# Patient Record
Sex: FEMALE | Race: BLACK OR AFRICAN AMERICAN | NOT HISPANIC OR LATINO | Employment: FULL TIME | URBAN - METROPOLITAN AREA
[De-identification: names, ages, dates, MRNs, and addresses within clinical notes are randomized per-mention and may not be internally consistent; named-entity substitution may affect disease eponyms.]

---

## 2019-10-22 ENCOUNTER — TELEPHONE (OUTPATIENT)
Dept: ORTHOPEDICS | Facility: CLINIC | Age: 44
End: 2019-10-22

## 2019-10-22 DIAGNOSIS — M79.605 LEFT LEG PAIN: Primary | ICD-10-CM

## 2019-10-23 ENCOUNTER — OFFICE VISIT (OUTPATIENT)
Dept: SPORTS MEDICINE | Facility: CLINIC | Age: 44
End: 2019-10-23
Payer: COMMERCIAL

## 2019-10-23 ENCOUNTER — HOSPITAL ENCOUNTER (OUTPATIENT)
Dept: RADIOLOGY | Facility: HOSPITAL | Age: 44
Discharge: HOME | End: 2019-10-23
Attending: ORTHOPAEDIC SURGERY
Payer: COMMERCIAL

## 2019-10-23 ENCOUNTER — TELEPHONE (OUTPATIENT)
Dept: SPORTS MEDICINE | Facility: CLINIC | Age: 44
End: 2019-10-23

## 2019-10-23 VITALS
SYSTOLIC BLOOD PRESSURE: 121 MMHG | HEIGHT: 64 IN | HEART RATE: 66 BPM | WEIGHT: 226 LBS | BODY MASS INDEX: 38.58 KG/M2 | DIASTOLIC BLOOD PRESSURE: 84 MMHG

## 2019-10-23 DIAGNOSIS — M79.605 LEFT LEG PAIN: ICD-10-CM

## 2019-10-23 DIAGNOSIS — S86.112A GASTROCNEMIUS MUSCLE STRAIN, LEFT, INITIAL ENCOUNTER: ICD-10-CM

## 2019-10-23 DIAGNOSIS — M79.662 PAIN OF LEFT LOWER LEG: ICD-10-CM

## 2019-10-23 DIAGNOSIS — M79.662 PAIN OF LEFT LOWER LEG: Primary | ICD-10-CM

## 2019-10-23 PROCEDURE — 99999 PR PBB SHADOW E&M-EST. PATIENT-LVL III: ICD-10-PCS | Mod: PBBFAC,,, | Performed by: ORTHOPAEDIC SURGERY

## 2019-10-23 PROCEDURE — 73590 X-RAY EXAM OF LOWER LEG: CPT | Mod: TC,LT

## 2019-10-23 PROCEDURE — 93970 US LOWER EXTREMITY VEINS BILATERAL: ICD-10-PCS | Mod: 26,,, | Performed by: RADIOLOGY

## 2019-10-23 PROCEDURE — 73590 X-RAY EXAM OF LOWER LEG: CPT | Mod: 26,LT,, | Performed by: RADIOLOGY

## 2019-10-23 PROCEDURE — 93970 EXTREMITY STUDY: CPT | Mod: 26,,, | Performed by: RADIOLOGY

## 2019-10-23 PROCEDURE — 3008F PR BODY MASS INDEX (BMI) DOCUMENTED: ICD-10-PCS | Mod: CPTII,S$GLB,, | Performed by: ORTHOPAEDIC SURGERY

## 2019-10-23 PROCEDURE — 3008F BODY MASS INDEX DOCD: CPT | Mod: CPTII,S$GLB,, | Performed by: ORTHOPAEDIC SURGERY

## 2019-10-23 PROCEDURE — 99204 OFFICE O/P NEW MOD 45 MIN: CPT | Mod: S$GLB,,, | Performed by: ORTHOPAEDIC SURGERY

## 2019-10-23 PROCEDURE — 73590 XR TIBIA FIBULA 2 VIEW LEFT: ICD-10-PCS | Mod: 26,LT,, | Performed by: RADIOLOGY

## 2019-10-23 PROCEDURE — 93970 EXTREMITY STUDY: CPT | Mod: TC

## 2019-10-23 PROCEDURE — 99204 PR OFFICE/OUTPT VISIT, NEW, LEVL IV, 45-59 MIN: ICD-10-PCS | Mod: S$GLB,,, | Performed by: ORTHOPAEDIC SURGERY

## 2019-10-23 PROCEDURE — 99999 PR PBB SHADOW E&M-EST. PATIENT-LVL III: CPT | Mod: PBBFAC,,, | Performed by: ORTHOPAEDIC SURGERY

## 2019-10-23 RX ORDER — ONDANSETRON 4 MG/1
TABLET, ORALLY DISINTEGRATING ORAL
COMMUNITY
Start: 2016-01-14

## 2019-10-23 RX ORDER — ESOMEPRAZOLE MAGNESIUM 40 MG/1
40 CAPSULE, DELAYED RELEASE ORAL
COMMUNITY

## 2019-10-23 RX ORDER — MELOXICAM 15 MG/1
15 TABLET ORAL DAILY
Qty: 30 TABLET | Refills: 2 | Status: SHIPPED | OUTPATIENT
Start: 2019-10-23

## 2019-10-23 RX ORDER — METHYLPREDNISOLONE 4 MG/1
TABLET ORAL
Qty: 21 TABLET | Refills: 0 | Status: SHIPPED | OUTPATIENT
Start: 2019-10-23 | End: 2019-11-13

## 2019-10-23 NOTE — PROGRESS NOTES
Subjective:     Patient ID: Neris Suarez is a 44 y.o. female.    Chief Complaint: Pain of the Left Lower Leg    Neris Suarez, a 44 y.o. female, presents today for evaluation of her LEFT lower leg. 4 week history of pain after playing laser tag. She reports pivoting and feeling pain throughout gastroc-soleus complex. Reports mild swell and bruising following  Denies numbness, tingling, radiating pain. Reports burning and pain with prolonged standing, ambulation and when attempting to toe walk. No prior trauma to left lower extremity.     Leg Pain    This is a new problem. The current episode started 1 to 4 weeks ago. There has been a history of trauma. The injury was the result of a twisting action while at work. The problem occurs daily. The problem has been gradually improving. The quality of the pain is described as throbbing, aching and sharp. The pain is at a severity of 3/10. Pertinent negatives include no fever or numbness. The symptoms are aggravated by activity, bearing weight and walking. She has tried OTC ointments and cold for the symptoms. The treatment provided mild relief. Physical therapy was not tried.      History reviewed. No pertinent past medical history.  History reviewed. No pertinent surgical history.  History reviewed. No pertinent family history.  Social History     Socioeconomic History    Marital status:      Spouse name: Not on file    Number of children: Not on file    Years of education: Not on file    Highest education level: Not on file   Occupational History    Not on file   Social Needs    Financial resource strain: Not on file    Food insecurity:     Worry: Not on file     Inability: Not on file    Transportation needs:     Medical: Not on file     Non-medical: Not on file   Tobacco Use    Smoking status: Never Smoker    Smokeless tobacco: Never Used   Substance and Sexual Activity    Alcohol use: Yes     Alcohol/week: 5.0 standard drinks     Types: 4  Glasses of wine, 1 Cans of beer per week     Frequency: 4 or more times a week     Drinks per session: 1 or 2     Binge frequency: Never    Drug use: Never    Sexual activity: Yes     Partners: Male   Lifestyle    Physical activity:     Days per week: Not on file     Minutes per session: Not on file    Stress: Not on file   Relationships    Social connections:     Talks on phone: Not on file     Gets together: Not on file     Attends Scientologist service: Not on file     Active member of club or organization: Not on file     Attends meetings of clubs or organizations: Not on file     Relationship status: Not on file   Other Topics Concern    Not on file   Social History Narrative    Not on file       Review of patient's allergies indicates:  No Known Allergies  Review of Systems   Constitution: Negative for chills and fever.   HENT: Negative for ear discharge and hearing loss.    Eyes: Negative for blurred vision and visual disturbance.   Cardiovascular: Negative for chest pain and leg swelling.   Respiratory: Negative for cough and shortness of breath.    Endocrine: Negative for polyuria.   Hematologic/Lymphatic: Negative for bleeding problem.   Skin: Negative for rash.   Musculoskeletal: Positive for muscle weakness. Negative for back pain, joint pain, joint swelling and muscle cramps.   Gastrointestinal: Negative for nausea and vomiting.   Genitourinary: Negative for hematuria.   Neurological: Negative for loss of balance, numbness and paresthesias.   Psychiatric/Behavioral: Negative for altered mental status.       Objective:   Body mass index is 38.79 kg/m².  Vitals:    10/23/19 1006   BP: 121/84   Pulse: 66                General    Vitals reviewed.  Constitutional: She is oriented to person, place, and time. She appears well-developed and well-nourished. No distress.   HENT:   Mouth/Throat: No oropharyngeal exudate.   Eyes: Right eye exhibits no discharge. Left eye exhibits no discharge.   Neck: Normal  range of motion.   Cardiovascular: Normal rate.    Pulmonary/Chest: Breath sounds normal. No respiratory distress.   Neurological: She is alert and oriented to person, place, and time. She has normal reflexes. No cranial nerve deficit. Coordination normal.   Psychiatric: She has a normal mood and affect. Her behavior is normal. Judgment and thought content normal.         Right Ankle/Foot Exam     Inspection   Scars: absent  Deformity: absent  Erythema: absent  Bruising: Ankle - absent Foot - absent  Effusion: Ankle - absent Foot - absent  Atrophy: Ankle - absent Foot - absent    Range of Motion   Ankle Joint   Dorsiflexion:  10 normal   Plantar flexion:  35 normal   Subtalar Joint   Inversion:  15 normal   Eversion:  5 normal   Choi Test:  negative    Alignment   Knee Alignment: neutral  Hindfoot Alignment: neutral  Midfoot Alignment: normal  Forefoot Alignment: normal    Tests   Anterior drawer: 1+  Varus tilt: 1+  Heel Walk: able to perform  Tiptoe Walk: able to perform  Single Heel Rise: able to perform  External Rotation Test: negative  Squeeze Test: negative    Other   Ankle Crepitus: absent  Foot Crepitus:  absent  Sensation: normal  Peroneal Subluxation: negative    Left Ankle/Foot Exam     Inspection  Deformity: absent  Erythema: absent  Bruising: Ankle - absent Foot - absent  Effusion: Ankle - absent Foot - absent  Atrophy: Ankle - absent Foot - absent  Scars: absent    Range of Motion   Ankle Joint  Dorsiflexion:  10 normal   Plantar flexion:  35 normal     Subtalar Joint   Inversion:  15 normal   Eversion:  5 normal   Choi Test:  normal    Alignment   Knee Alignment: neutral  Hindfoot Alignment: neutral  Midfoot Alignment: normal  Forefoot Alignment: normal    Tests   Anterior drawer: 1+  Varus tilt: 1+  Heel Walk: able to perform  Tiptoe Walk: able to perform  Single Heel Rise: unable to perform  External Rotation Test: negative  Squeeze Test: absent    Other   Foot Crepitus:  absent  Ankle  Crepitus: absent  Sensation: decreased  Peroneal Subluxation: negative    Comments:  Tenderness throughout medial gastroc muscle      Muscle Strength   Right Lower Extremity   Anterior tibial:  5/5/5  Posterior tibial:  5/5/5  Gastrocsoleus:  5/5/5  Peroneal muscle:  5/5/5  EHL:  5/5  FDL: 5/5  EDL: 5/5  FHL: 5/5  Left Lower Extremity   Anterior tibial:  5/5/5   Posterior tibial:  5/5/5  Gastrocsoleus:  4/5/5  Peroneal muscle:  5/5/5  EHL:  5/5  FDL: 5/5  EDL: 5/5  FHL: 5/5    Reflexes     Left Side  Post Tibial:  2+  Achilles:  2+  Plantar Reflex: 2+    Right Side   Post Tibial:  2+  Achilles:  2+  Plantar Reflex: 2+    Vascular Exam     Right Pulses  Dorsalis Pedis:      2+  Posterior Tibial:      2+        Left Pulses  Dorsalis Pedis:      2+  Posterior Tibial:      2+        Edema  Right Lower Leg: absent  Left Lower Leg: absent      Relevant imaging results reviewed and interpreted by me, discussed with the patient and / or family today     Reading Physician Reading Date Result Priority   Jesus Pena MD 10/23/2019 Routine      Narrative     EXAMINATION:  XR TIBIA FIBULA 2 VIEW LEFT    CLINICAL HISTORY:  Pain in left leg    TECHNIQUE:  AP and lateral views of the left tibia and fibula were performed.    COMPARISON:  None.    FINDINGS:  There is no radiographic evidence of acute osseous, articular, or soft tissue abnormality.  Joint spaces are preserved.      Impression       No acute findings      Electronically signed by: Jesus Pena MD  Date: 10/23/2019  Time: 10:15     Reading Physician Reading Date Result Priority   Jesus Pena MD 10/23/2019 STAT      Narrative     EXAMINATION:  US LOWER EXTREMITY VEINS BILATERAL    CLINICAL HISTORY:  r/o DVT; Pain in left lower leg    TECHNIQUE:  Duplex and color flow Doppler and dynamic compression was performed of the bilateral lower extremity veins was performed.    COMPARISON:  None    FINDINGS:  Right thigh veins: The common femoral, femoral,  popliteal, upper greater saphenous, and deep femoral veins are patent and free of thrombus. The veins are normally compressible and have normal phasic flow and augmentation response.    Right calf veins: The visualized calf veins are patent.    Left thigh veins: The common femoral, femoral, popliteal, upper greater saphenous, and deep femoral veins are patent and free of thrombus. The veins are normally compressible and have normal phasic flow and augmentation response.    Left calf veins: The visualized calf veins are patent.    Miscellaneous: Small volume of poorly organized fluid noted in the myofascial planes of the left calf.      Impression       No evidence of deep venous thrombosis in either lower extremity.    Small volume nonspecific poorly organized fluid noted within the myofascial planes of the left calf.      Electronically signed by: Jesus Pena MD  Date: 10/23/2019  Time: 13:24       Assessment:     Encounter Diagnoses   Name Primary?    Pain of left lower leg Yes    Gastrocnemius muscle strain, left, initial encounter         Plan:     Duplex to r/o dvt-negative    We reviewed with Neris today, the pathology and natural history of her diagnosis. We had an extensive discussion as to the conservative treatment and management of their condition. We also discussed the variety of treatment options to include medication, physical therapy, diagnostic testing as well as other treatments.The decision was made to go forward with:    -PT  -Medrol then mobic    -f/up 6wk                   Disclaimer: This note was prepared using a voice recognition system and is likely to have sound alike errors within the text.

## 2019-10-23 NOTE — TELEPHONE ENCOUNTER
----- Message from Chemo Obregon MD sent at 10/23/2019  2:12 PM CDT -----  Please let her know her duplex was negative for DVT. Thanks

## 2019-11-06 ENCOUNTER — CLINICAL SUPPORT (OUTPATIENT)
Dept: REHABILITATION | Facility: HOSPITAL | Age: 44
End: 2019-11-06
Attending: ORTHOPAEDIC SURGERY
Payer: COMMERCIAL

## 2019-11-06 DIAGNOSIS — M79.662 PAIN OF LEFT LOWER LEG: Primary | ICD-10-CM

## 2019-11-06 PROCEDURE — 97161 PT EVAL LOW COMPLEX 20 MIN: CPT | Performed by: PHYSICAL THERAPIST

## 2019-11-06 PROCEDURE — 97110 THERAPEUTIC EXERCISES: CPT | Performed by: PHYSICAL THERAPIST

## 2019-11-06 NOTE — PLAN OF CARE
[facility]  Physical Therapy Initial Evaluation    Name: Neris Suarez  Clinic Number: [id number]    Therapy Diagnosis:   Encounter Diagnosis   Name Primary?    Pain of left lower leg Yes     Physician: Chemo Obregon, *    Physician Orders: PT Eval and Treat  Medical Diagnosis from Referral: Pain of left lower leg  Evaluation Date: 11/6/2019  Authorization Period Expiration: 12/31/2019  Plan of Care Expiration: 1/5/2020  Visit # / Visits authorized: 1/ 16    Precautions: none    Time In: 11:10am  Time Out: 12:00pm  Total Billable Time: 50 minutes    SUBJECTIVE     Date of onset: 6 weeks ago    History of current condition - Neris is a 44 y.o. female whom reports that she was playing laser tag three weeks ago, and when she went to [facility] she felt an extreme pain in her left calf muscle. Patient reports that she felt pressure and as if it was an extreme chuck horse. For the next three days, she was in the bed taking ibuprofen, icing, and elevating it. She could barely walk. Patient reports that it started to get a little better but then she had to take a flight to [employer]. It hurt her the whole time. 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 About 2:00 that day, it hurt like a toothache. When she got back to the hotel, she noticed that the entire calf was swollen, black, and blue. At four weeks since the incident, since it was still hurting her, she went to her MD. Climbing stairs increases s/s, and she has been unable to work out or job. Patient reports that she is finally feeling a little better, but she still feels pain with the previously stated activities, after standing too long, or after attempting to wear heels. Patient did receive an ultrasound to check for a DVT, but testing was negative.      Medical History:   No past medical history on file.    Surgical History:   Neris Suarez  has no past surgical history on  file.    Medications:   Neris has a current medication list which includes the following prescription(s): esomeprazole, meloxicam, methylprednisolone, and ondansetron.    Allergies:   Review of patient's allergies indicates:  No Known Allergies     Imaging: x-rays on 10/23/2019  FINDINGS:  There is no radiographic evidence of acute osseous, articular, or soft tissue abnormality.  Joint spaces are preserved.    Prior Therapy: N/A  Social History: Pt lives alone. Patient is  but spouse lives out of state so they only see each other a couple weeks at a time.  Occupation: Pt is a delivery  Manager. She does not have to do any heavy lifting  Prior Level of Function: Patient was able to work out, job, and wear high heels without pain or limitation.  Current Level of Function: Patient is unable to work out, jog, or wear high heels without pain or limitations.     Pain:  Current 1/10, worst 4/10, best 0/10   Location: left lower leg(s)  Description: Aching and Tight  Aggravating Factors: stairs, standing for long periods of time or excessive walking k  Easing Factors: rest    Dominant Extremity: Right    Pts goals: Pt reported goals are to return to working out, jogging, and wearing high heels without pain.     OBJECTIVE   (x = not tested due to pain and/or inability to obtain test position)    RANGE OF MOTION:  Left ankle and knee ROM are WNL.    STRENGTH:      L/E MMT Right  11/6/2019 Left  11/6/2019 Pain/Dysfunction with Movement Goal   Hip Flexion  4+/5 4+/5  5/5 B    Hip Extension  4+/5 4+/5  5/5 B   Hip Abduction  4+/5 4+/5  5/5 B   Knee Extension 5/5 5/5  5/5 B   Knee Flexion 5/5 5/5  5/5 B   Ankle DF 5/5 5/5  5/5 B   Ankle PF 5/5   5/5  5/5 B     Ankle Inversion 5/5 5/5  5/5 B   Ankle Eversion 5/5 5/5  5/5 B       MUSCLE LENGTH:     Muscle Tested  Right  11/6/2019 Left   11/6/2019 Goal   Hamstrings  decreased decreased Normal B   Gastrocnemius  decreased decreased Normal B   Soleus  decreased decreased  Normal B     JOINT MOBILITY:     Joint Motion Tested Right  (spine)  11/6/2019 Left   11/6/2019 Goal   Tibiofemoral Normal Normal Normal B   Talocrural  Normal Normal Normal B         Sensation:  Sensation is intact to light touch    Palpation: Increased tone and moderate tenderness noted with palpation of left gastrocnemius  and soleus.       Gait Analysis: Patient ambulates with only mild antalgic gait, including decreased left heel strike and push off as compared to right.         FUNCTION:       LOWER EXTREMITY FUNCTIONAL SCALE  Patient-reported functional assessment scores are rated as follows:  A score of 0/4 represents extreme difficulty or unable to perform the activity. A score of 1/4 represents quite a bit of difficulty. A score of 2/4 represents moderate difficulty. A score of 3/4 represents a little bit of difficulty. A score of 4/4 represents no difficulty.        11/6/2019   1. Any of your usual work, housework or school activities 3/4   2. Your usual hobbies, sporting 0/4   3. Getting in and out of tub 3/4   4. Walking between rooms 4/4   5. Putting on shoes or socks 4/4   6. Squatting 0/4   7. Lifting an object from the ground   4/4   8. Performing light activities around the home 3/4   9. Performing heavy activities around the home 1/4   10. Getting in and out of car 3/4   11. Walking 2 blocks 1/4   12.Walking a mile 0/4   13. Getting up and down 1 flight of stairs 1/4   14. Standing for 1 hour 1/4   15. Sitting for an hour  4/4   16. Running on even ground  0/4   17. Running on uneven ground 0/4   18. Making sharp turns when running fast 0/4   19. Hopping  0/4   20. Rolling over in bed 4/4       Patient reports 45% ability based on score of the Lower Extremity Functional Scale on 11/6/2019..         TREATMENT   Treatment Time In: 11:10am  Treatment Time Out: 12:00pm  Total Treatment time separate from Evaluation: 15 minutes    Neris received therapeutic exercises to develop strength, endurance,  flexibility and core stabilization for 15 minutes including:    Exercise 11/6/2019   HEP established. Patient demonstrated and expressed understanding 15'                               x = exercise details same as prior session      Home Exercises and Patient Education Provided    Education/Self-Care provided:    Patient educated on the impairments noted above and the effects of physical therapy intervention to improve overall condition and QOL.       Written Home Exercises Provided: yes.  Exercises were reviewed and Neris was able to demonstrate them prior to the end of the session.  Neris demonstrated good  understanding of the education provided.     See EMR under Patient Instructions for exercises provided 11/6/2019.    ASSESSMENT   Neris is a 44 y.o. female referred to outpatient Physical Therapy with a medical diagnosis of pain in left lower leg. Pt presents with impairments including: decreased strength, decreased muscle length, gait abnormalities and decreased overall function.    Pt prognosis is Good.   Pt will benefit from skilled outpatient Physical Therapy to address the deficits stated above and in the chart below, provide pt/family education, and to maximize pt's level of independence.     Plan of care discussed with patient: Yes  Pt's spiritual, cultural and educational needs considered and patient is agreeable to the plan of care and goals as stated below:     Anticipated Barriers for therapy: none    Medical Necessity is demonstrated by the following  History  Co-morbidities and personal factors that may impact the plan of care Co-morbidities:   none    Personal Factors:   no deficits     low   Examination  Body Structures and Functions, activity limitations and participation restrictions that may impact the plan of care Body Regions:   lower extremities    Body Systems:    strength  gait  motor control    Participation Restrictions:   Patient is unable to work out, jog, or wear high heels  without pain or limitations.     Activity limitations:   Learning and applying knowledge  no deficits    General Tasks and Commands  no deficits    Communication  no deficits    Mobility  walking    Self care  no deficits    Domestic Life  shopping  cooking  doing house work (cleaning house, washing dishes, laundry)    Interactions/Relationships  no deficits    Life Areas  no deficits    Community and Social Life  community life  recreation and leisure         moderate   Clinical Presentation stable and uncomplicated low   Decision Making/ Complexity Score: low       GOALS:    Short Term Goals:  6 weeks    1. Pain: Pt will demonstrate improved pain by reports of less than or equal to 3/10 worst pain on the verbal rating scale in order to progress toward maximal functional ability and improve QOL.    2. Function: Patient will demonstrate improved function as indicated by a score of greater than or equal to 60% on the Lower Extremity Functional Scale    3. Strength: Patient will improve strength to 50% of stated goals, listed in objective measures above, in order to progress towards independence with functional activities.     4. Gait: Patient will demonstrate improved gait mechanics in order to improve functional mobility, improve quality of life, and decrease risk of further injury or fall.     5. HEP: Patient will demonstrate independence with HEP in order to progress toward functional independence.      Long Term Goals:  12 weeks    1. Pain: Pt will demonstrate improved pain by reports of less than or equal to 2/10 worst pain on the verbal rating scale in order to progress toward maximal functional ability and improve QOL.      2. Function: Patient will demonstrate improved function as indicated by a score of greater than or equal to 75% on the Lower Extremity Functional Scale    3. Strength: Patient will improve strength to stated goals, listed in objective measures above, in order to improve functional  independence and quality of life.    4. Gait: Patient will demonstrate normalized gait mechanics with minimal compensation in order to return to PLOF.    5. Patient will return to normal ADL's, IADL's, community involvement, recreational activities, and work-related activities with less than or equal to 0/10 pain and maximal function.     6. Patient will return to working out, jogging, and wearing high heels without pain or limitation.         PLAN   Plan of care Certification: 11/6/2019 to 1/5/2020.    Outpatient Physical Therapy 2 times weekly for 12 weeks to include any combination of the following interventions: dry needling, modalities, electrical stimulation (IFC, Pre-Mod, Attended with Functional Dry Needling), Gait Training, Manual Therapy, Neuromuscular Re-ed, Patient Education, Self Care, Therapeutic Activites and Therapeutic Exercise     Thank you for this referral.    These services are reasonable and necessary for the conditions set forth above while under my care.    Lorena Gutierrez, PT, DPT

## 2019-11-20 ENCOUNTER — CLINICAL SUPPORT (OUTPATIENT)
Dept: REHABILITATION | Facility: HOSPITAL | Age: 44
End: 2019-11-20
Payer: COMMERCIAL

## 2019-11-20 DIAGNOSIS — M79.662 PAIN OF LEFT LOWER LEG: ICD-10-CM

## 2019-11-20 PROCEDURE — 97140 MANUAL THERAPY 1/> REGIONS: CPT

## 2019-11-20 PROCEDURE — 97110 THERAPEUTIC EXERCISES: CPT

## 2019-11-20 NOTE — PROGRESS NOTES
"  Physical Therapy Daily Treatment Note     Name: Neris [facility] Number: [id number]    Therapy Diagnosis:   Encounter Diagnosis   Name Primary?    Pain of left lower leg      Physician: Chemo Obregon, *    Visit Date: 11/20/2019    Physician Orders: PT Eval and Treat  Medical Diagnosis from Referral: Pain of left lower leg  Evaluation Date: 11/6/2019  Authorization Period Expiration: 12/31/2019  Plan of Care Expiration: 1/5/2020  Visit # / Visits authorized: 2/ 16     Precautions: none    Time In: 2:30pm  Time Out: 3:30pm  Total Billable Time: 60 minutes    SUBJECTIVE     Today, pt reports: that she was at a point of no pain a couple of weeks ago, but then [place] and she had an increase in pain.   She was compliant with home exercise program.  Response to previous treatment:  Functional change:     TREATMENT     Neris received therapeutic exercises to develop strength, endurance, flexibility and core stabilization for 45 minutes including:    Exercise 11/20/2019   Bike 10'   gastroc stretch 4x30"   Soleus stretch 4x30"   Hamstring stretch 4x30"   Calf raises on stairs 3x10   Eccentric calf rasies 3x10   Long arc quads 3# 3x10   Standing hamstring curls 3#3x10   Standing hip abduction  Red theraband 3x10   Standing hip extension Red theraband 3x10   Single leg stance 4x30"               Neris received the following manual therapy techniques: soft tissue mobilization, myofascial release, and ASTM to left gastrocnemius and soleus for 15 minutes.         Home Exercises Provided and Patient Education Provided     Education/Self-Care provided: (minutes)    Written Home Exercises Provided: Patient instructed to cont prior HEP.  Exercises were reviewed and Neris was able to demonstrate them prior to the end of the session.  Neris demonstrated good  understanding of the education provided.     See EMR under Patient Instructions for exercises provided prior visit.    ASSESSMENT "   Patient tolerated all therex well with minimal complaints of discomfort, modified as needed when patient was having discomfort to decrease pain. Patient was very challenged by calf raise activities. Noted increased tightness and tenderness during manual therapy which decreased significantly with ASTM. Patient left treatment feeling fatigued and educated patient on icing at home if increased soreness is present.     Neris is progressing well towards her goals.   Pt prognosis is Excellent.     Pt will continue to benefit from skilled outpatient physical therapy to address the deficits listed in the problem list box on initial evaluation, provide pt/family education and to maximize pt's level of independence in the home and community environment.     Pt's spiritual, cultural and educational needs considered and pt agreeable to plan of care and goals.     Anticipated barriers to physical therapy: none    Goals:   Short Term Goals:  6 weeks     1. Pain: Pt will demonstrate improved pain by reports of less than or equal to 3/10 worst pain on the verbal rating scale in order to progress toward maximal functional ability and improve QOL.     2. Function: Patient will demonstrate improved function as indicated by a score of greater than or equal to 60% on the Lower Extremity Functional Scale     3. Strength: Patient will improve strength to 50% of stated goals, listed in objective measures above, in order to progress towards independence with functional activities.      4. Gait: Patient will demonstrate improved gait mechanics in order to improve functional mobility, improve quality of life, and decrease risk of further injury or fall.      5. HEP: Patient will demonstrate independence with HEP in order to progress toward functional independence.        Long Term Goals:  12 weeks     1. Pain: Pt will demonstrate improved pain by reports of less than or equal to 2/10 worst pain on the verbal rating scale in order to  progress toward maximal functional ability and improve QOL.       2. Function: Patient will demonstrate improved function as indicated by a score of greater than or equal to 75% on the Lower Extremity Functional Scale     3. Strength: Patient will improve strength to stated goals, listed in objective measures above, in order to improve functional independence and quality of life.     4. Gait: Patient will demonstrate normalized gait mechanics with minimal compensation in order to return to PLOF.     5. Patient will return to normal ADL's, IADL's, community involvement, recreational activities, and work-related activities with less than or equal to 0/10 pain and maximal function.      6. Patient will return to working out, jogging, and wearing high heels without pain or limitation.           PLAN   Plan of care Certification: 11/6/2019 to 1/5/2020.     Continue Plan of Care (POC) and progress per patient tolerance.    Flaquita Clinton PTA,

## 2020-03-24 ENCOUNTER — DOCUMENTATION ONLY (OUTPATIENT)
Dept: REHABILITATION | Facility: HOSPITAL | Age: 45
End: 2020-03-24

## 2020-03-24 PROBLEM — M79.662 PAIN OF LEFT LOWER LEG: Status: RESOLVED | Noted: 2019-10-23 | Resolved: 2020-03-24

## 2020-03-24 NOTE — PROGRESS NOTES
Outpatient Therapy Discharge Summary     Name: Neris Suarez  [facility] Number: [id number]    Therapy Diagnosis:        Encounter Diagnosis   Name Primary?    Pain of left lower leg        Physician: Chemo Obregon, *     Visit Date: 11/20/2019    Physician Orders: PT Eval and Treat  Medical Diagnosis from Referral: Pain of left lower leg  Evaluation Date: 11/6/2019      Date of Last visit: 11/20/2019  Total Visits Received: 2  Cancelled Visits: 7  No Show Visits: 1    Assessment      Goals:   Short Term Goals:  6 weeks     1. Pain: Pt will demonstrate improved pain by reports of less than or equal to 3/10 worst pain on the verbal rating scale in order to progress toward maximal functional ability and improve QOL.     2. Function: Patient will demonstrate improved function as indicated by a score of greater than or equal to 60% on the Lower Extremity Functional Scale     3. Strength: Patient will improve strength to 50% of stated goals, listed in objective measures above, in order to progress towards independence with functional activities.      4. Gait: Patient will demonstrate improved gait mechanics in order to improve functional mobility, improve quality of life, and decrease risk of further injury or fall.      5. HEP: Patient will demonstrate independence with HEP in order to progress toward functional independence.        Long Term Goals:  12 weeks     1. Pain: Pt will demonstrate improved pain by reports of less than or equal to 2/10 worst pain on the verbal rating scale in order to progress toward maximal functional ability and improve QOL.       2. Function: Patient will demonstrate improved function as indicated by a score of greater than or equal to 75% on the Lower Extremity Functional Scale     3. Strength: Patient will improve strength to stated goals, listed in objective measures above, in order to improve functional independence and quality of life.     4. Gait: Patient will demonstrate  normalized gait mechanics with minimal compensation in order to return to PLOF.     5. Patient will return to normal ADL's, IADL's, community involvement, recreational activities, and work-related activities with less than or equal to 0/10 pain and maximal function.      6. Patient will return to working out, jogging, and wearing high heels without pain or limitation.             Discharge reason: Patient has not attended therapy since 11/20//2019.    Plan   This patient is discharged from Physical Therapy